# Patient Record
Sex: FEMALE | Race: WHITE | Employment: FULL TIME | URBAN - METROPOLITAN AREA
[De-identification: names, ages, dates, MRNs, and addresses within clinical notes are randomized per-mention and may not be internally consistent; named-entity substitution may affect disease eponyms.]

---

## 2017-05-01 ENCOUNTER — HOSPITAL ENCOUNTER (INPATIENT)
Facility: CLINIC | Age: 62
Setting detail: SURGERY ADMIT
End: 2017-05-01
Attending: ORTHOPAEDIC SURGERY | Admitting: ORTHOPAEDIC SURGERY
Payer: COMMERCIAL

## 2017-06-17 ENCOUNTER — HEALTH MAINTENANCE LETTER (OUTPATIENT)
Age: 62
End: 2017-06-17

## 2019-08-21 ENCOUNTER — HOSPITAL ENCOUNTER (EMERGENCY)
Facility: CLINIC | Age: 64
Discharge: HOME | End: 2019-08-22
Attending: EMERGENCY MEDICINE | Admitting: EMERGENCY MEDICINE
Payer: COMMERCIAL

## 2019-08-21 ENCOUNTER — APPOINTMENT (OUTPATIENT)
Dept: GENERAL RADIOLOGY | Facility: CLINIC | Age: 64
End: 2019-08-21
Attending: EMERGENCY MEDICINE
Payer: COMMERCIAL

## 2019-08-21 VITALS
SYSTOLIC BLOOD PRESSURE: 101 MMHG | WEIGHT: 140 LBS | RESPIRATION RATE: 13 BRPM | HEART RATE: 58 BPM | HEIGHT: 63 IN | BODY MASS INDEX: 24.8 KG/M2 | DIASTOLIC BLOOD PRESSURE: 52 MMHG | TEMPERATURE: 99 F | OXYGEN SATURATION: 94 %

## 2019-08-21 DIAGNOSIS — S73.005A CLOSED DISLOCATION OF LEFT HIP, INITIAL ENCOUNTER (H): ICD-10-CM

## 2019-08-21 PROCEDURE — 40000275 ZZH STATISTIC RCP TIME EA 10 MIN

## 2019-08-21 PROCEDURE — 25000128 H RX IP 250 OP 636: Performed by: EMERGENCY MEDICINE

## 2019-08-21 PROCEDURE — 99285 EMERGENCY DEPT VISIT HI MDM: CPT | Mod: 25

## 2019-08-21 PROCEDURE — 40000986 XR PELVIS AND HIP PORTABLE LEFT 1 VIEW

## 2019-08-21 PROCEDURE — 27265 TREAT HIP DISLOCATION: CPT | Mod: LT

## 2019-08-21 PROCEDURE — 99153 MOD SED SAME PHYS/QHP EA: CPT

## 2019-08-21 PROCEDURE — 96374 THER/PROPH/DIAG INJ IV PUSH: CPT | Mod: 59

## 2019-08-21 PROCEDURE — 73502 X-RAY EXAM HIP UNI 2-3 VIEWS: CPT

## 2019-08-21 PROCEDURE — 99152 MOD SED SAME PHYS/QHP 5/>YRS: CPT

## 2019-08-21 RX ORDER — HYDROMORPHONE HYDROCHLORIDE 1 MG/ML
0.5 INJECTION, SOLUTION INTRAMUSCULAR; INTRAVENOUS; SUBCUTANEOUS
Status: DISCONTINUED | OUTPATIENT
Start: 2019-08-21 | End: 2019-08-22 | Stop reason: HOSPADM

## 2019-08-21 RX ORDER — PROPOFOL 10 MG/ML
INJECTION, EMULSION INTRAVENOUS DAILY PRN
Status: COMPLETED | OUTPATIENT
Start: 2019-08-21 | End: 2019-08-21

## 2019-08-21 RX ORDER — PROPOFOL 10 MG/ML
200 INJECTION, EMULSION INTRAVENOUS
Status: DISCONTINUED | OUTPATIENT
Start: 2019-08-21 | End: 2019-08-22 | Stop reason: HOSPADM

## 2019-08-21 RX ADMIN — PROPOFOL 20 MG: 10 INJECTION, EMULSION INTRAVENOUS at 22:52

## 2019-08-21 RX ADMIN — PROPOFOL 20 MG: 10 INJECTION, EMULSION INTRAVENOUS at 22:54

## 2019-08-21 RX ADMIN — HYDROMORPHONE HYDROCHLORIDE 0.5 MG: 1 INJECTION, SOLUTION INTRAMUSCULAR; INTRAVENOUS; SUBCUTANEOUS at 22:13

## 2019-08-21 RX ADMIN — PROPOFOL 40 MG: 10 INJECTION, EMULSION INTRAVENOUS at 22:51

## 2019-08-21 ASSESSMENT — MIFFLIN-ST. JEOR: SCORE: 1169.17

## 2019-08-21 NOTE — ED AVS SNAPSHOT
Emergency Department  [address] [facility] [address]  Phone:  [phone]  Fax:  [phone]                                    Ammy Rose   MRN: [id number]    Department:   Emergency Department   Date of Visit:  8/21/2019           After Visit Summary Signature Page    I have received my discharge instructions, and my questions have been answered. I have discussed any challenges I see with this plan with the nurse or doctor.    ..........................................................................................................................................  Patient/Patient Representative Signature      ..........................................................................................................................................  Patient Representative Print Name and Relationship to Patient    ..................................................               ................................................  Date                                   Time    ..........................................................................................................................................  Reviewed by Signature/Title    ...................................................              ..............................................  Date                                               Time          22EPIC Rev 08/18

## 2019-08-22 PROCEDURE — 25000131 ZZH RX MED GY IP 250 OP 636 PS 637: Performed by: EMERGENCY MEDICINE

## 2019-08-22 RX ORDER — ONDANSETRON 4 MG/1
4 TABLET, ORALLY DISINTEGRATING ORAL ONCE
Status: COMPLETED | OUTPATIENT
Start: 2019-08-22 | End: 2019-08-22

## 2019-08-22 RX ADMIN — ONDANSETRON 4 MG: 4 TABLET, ORALLY DISINTEGRATING ORAL at 00:20

## 2019-08-22 NOTE — ED PROVIDER NOTES
"  History     Chief Complaint:  Hip pain     HPI   Ammy Rose is a 63 year old female, with a history of bilateral hip dislocation, who presents with left hip pain. The patient states that she was sitting on the floor this evening when she moved to her knees and felt severe left hip pain consistent with her past presentation of hip dislocation, prompting her ED visit. Here, the patient states that her last left hip dislocation was in January 2019 after she was putting her coat on. The patient had her left hip replaced approximately 5 years ago. Of note, the patient last ate at [address] [employer].       Allergies:  NKDA     Medications:    Norco  Hydroxyzine  Lisinopril  Trazodone  Effexor    Past Medical History:    Anxiety  Hypertension  Insomnia     Past Surgical History:    Breast implants  Bunionectomy  Oral surgery  Labiaplasty, partial hysterectomy  Repair hammer toe    Family History:    No past pertinent family history.     Social History:  Positive for alcohol use.   Negative for tobacco use.   Marital Status:  Single [1]     Review of Systems   Musculoskeletal:        Left hip pain   All other systems reviewed and are negative.      Physical Exam   First Vitals:  BP: 101/52  Pulse: 59  Heart Rate: 59  Temp: 99  F (37.2  C)  Resp: 13  Height: 160 cm (5' 3\")  Weight: 63.5 kg (140 lb)  SpO2: 95 %    Physical Exam  General: No distress.   Head: No signs of trauma.   Mouth/Throat: Oropharynx moist.   Eyes: Conjunctivae are normal. Pupils are equal..   Neck: Normal range of motion.    Resp:No respiratory distress.   MSK: Normal range of motion. No obvious deformity.  Neuro: The patient is alert and interactive. SWEET. Speech normal. GCS 15  Skin: No lesion or sign of trauma noted.   Psych: normal mood and affect. behavior is normal.      Emergency Department Course   Imaging:  Radiographic findings were communicated with the patient who voiced understanding of the findings.  XR Pelvis and Hip, left, 1 view: "   Superior dislocation of the left total hip arthroplasty.  No fracture. As per radiology.     XR Pelvis w Hip Port, left, 1 view: Post reduction   The left hip arthroplasty has been successfully reduced.  No fracture. As per radiology.     [facility]    Procedure: Hip dislocation reduction  Date/Time: 8/21/2019 10:09 PM  Performed by: Ray Jones MD  Authorized by: Ray Jones MD     UNIVERSAL PROTOCOL   Site Marked: Yes  Prior Images Obtained and Reviewed:  Yes  Required items: Required blood products, implants, devices and special equipment available    Patient identity confirmed:  Verbally with patient  Patient was reevaluated immediately before administering moderate or deep sedation or anesthesia  Confirmation Checklist:  Patient's identity using two indicators  Time out: Immediately prior to the procedure a time out was called    Preparation: Patient was prepped and draped in usual sterile fashion      SEDATION    Patient Sedated: Yes    Sedation:  Propofol  Vital signs: Vital signs monitored during sedation    PROCEDURE   Patient Tolerance:  Patient tolerated the procedure well with no immediate complications    Time of Sedation in Minutes by Physician:  45  :  [facility] Procedure Note        Sedation:      Performed by: Dr. Jones    Pre-Procedure Assessment done at 2200.    Sedation Level:  Deep Sedation    Indication:  Sedation is required to allow for joint reduction    Consent obtained from patient after discussing the risks, benefits and alternatives.    PO Intake:  Appropriately NPO for procedure    ASA Class:  Class 1 - HEALTHY PATIENT    Mallampati:  Grade 1:  Soft palate, uvula, tonsillar pillars, and posterior pharyngeal wall visible    Lungs: Lungs Clear with good breath sounds bilaterally.     Heart: Normal heart sounds and rate    History and physical reviewed and no updates needed. I have reviewed the lab findings, diagnostic data, medications, and the  plan for sedation. I have determined this patient to be an appropriate candidate for the planned sedation and procedure and have reassessed the patient IMMEDIATELY PRIOR to sedation and procedure.      Sedation Post Procedure Summary:    Prior to the start of the procedure and with procedural staff participation, I verbally confirmed the patient s identity using two indicators, relevant allergies, that the procedure was appropriate and matched the consent or emergent situation, and that the correct equipment/implants were available. Immediately prior to starting the procedure I conducted the Time Out with the procedural staff and re-confirmed the patient s name, procedure, and site/side. (The Joint Commission universal protocol was followed.)  Yes      Sedatives: Propofol    Vital signs, airway, End Tidal CO2 and pulse oximetry were monitored and remained stable throughout the procedure and sedation was maintained until the procedure was complete.  The patient was monitored by staff until sedation discharge criteria were met.    Patient tolerance: Patient tolerated the procedure well with no immediate complications.    Time of sedation in minutes:  20 minutes from beginning to end of physician one to one monitoring.      Narrative: Procedure: Reduction       Location: Left hip     Consent:  Risks, benefits and alternatives were discussed with patient and consent for procedure was obtained.     Timeout:  Universal protocol was followed. TIME OUT conducted just prior to starting procedure confirmed patient identity, site/side, procedure, patient position, and availability of correct equipment and implants? Yes      Medication:  Propofol: IV     Procedure Note:  Traction was applied and angulation was recreated and reduced. Good alignment was confirmed by fluoroscopy and post reduction films were obtained. The patient tolerated the procedure well and there were no complications.     Patient Status:  Patient tolerated  the procedure well.  There were no complications. s were obtained. The patient tolerated the procedure well and there were no complications.      Interventions:  2213 Dilaudid, 0.5 mg, IV  2254 Propofol, 20 mg, IV      Emergency Department Course:  Nursing notes and vitals reviewed.     2202  I performed an exam of the patient as documented above.     IV inserted. Medicine administered as documented above.     The patient was sent for a left hip XR while in the emergency department, findings above.     2254 I had performed the sedation and reduction as noted above. The patient tolerated the procedure well and there were no complications.     2330 I rechecked the patient and discussed the results of their workup thus far.      Findings and plan explained to the Patient. Patient discharged home with instructions regarding supportive care, medications, and reasons to return. The importance of close follow-up was reviewed.     Impression & Plan      Medical Decision Making:  Ammy Rose is a 63 year old female who presents for evaluation of left hip pain.  This is consistent by clinical and radiological exam with a hip dislocation of a previous total hip arthroplasty. The dislocation was successfully reduced and a knee immobilizer was placed by myself, the fit was checked.  The neurovascular exam is normal pre and post-reduction.  I will have patient stay in the immobilizer until follow-up with orthopedics in 3-5 days.  The head to toe trauma exam is otherwise normal and I doubt serious underlying spinal, intracerebral, chest, abdominal, pelvic or extremity trauma.  Did discuss patient with orthopedics who agrees with plan.  Repeat xray shows no fracture.  Doubt sciatic nerve or other nerve injury, vascular injury, or other complication of dislocation.  Ambulated with knee immobilizer on and did well.  All questions were answered prior to discharge.     Diagnosis:    ICD-10-CM   1. Closed dislocation of left hip,  initial encounter (H) S73.005A       Disposition:  discharged to home    I, Candy Sparks, am serving as a scribe on 8/21/2019 at 9:55 PM to personally document services performed by Ray Jones MD based on my observations and the provider's statements to me.      Candy Sparks  8/21/2019    EMERGENCY DEPARTMENT       Ray Jones MD  08/22/19 0317

## 2019-08-22 NOTE — ED NOTES
Bed: ED26  Expected date:   Expected time:   Means of arrival:   Comments:  441 63F L side pelvicpain, disslocation. Eta 8

## 2019-08-22 NOTE — ED TRIAGE NOTES
Was sitting on the ground on her knees when felt left hip pop out of place. Hx of hip replacement 5 yrs ago, dislocation has happened before; HTN.